# Patient Record
Sex: MALE | Race: BLACK OR AFRICAN AMERICAN | NOT HISPANIC OR LATINO | Employment: UNEMPLOYED | URBAN - METROPOLITAN AREA
[De-identification: names, ages, dates, MRNs, and addresses within clinical notes are randomized per-mention and may not be internally consistent; named-entity substitution may affect disease eponyms.]

---

## 2021-08-24 ENCOUNTER — HOSPITAL ENCOUNTER (EMERGENCY)
Facility: HOSPITAL | Age: 26
Discharge: LEFT BEFORE DISCHARGE | End: 2021-08-24

## 2021-08-24 VITALS
TEMPERATURE: 98 F | SYSTOLIC BLOOD PRESSURE: 138 MMHG | HEART RATE: 83 BPM | DIASTOLIC BLOOD PRESSURE: 90 MMHG | RESPIRATION RATE: 18 BRPM | OXYGEN SATURATION: 99 %

## 2021-08-24 PROCEDURE — 99211 OFF/OP EST MAY X REQ PHY/QHP: CPT

## 2021-08-24 NOTE — ED TRIAGE NOTES
Patient to triage desk and stated he wants to leave related to wait time. Encourage patient to stay at this time. Patient stated he wants to go to urgent care. Patient left at this time.

## 2021-08-24 NOTE — ED TRIAGE NOTES
Pt states that he cut the webbing of his thumb and first finger with a knife. Went to [facility] prior to arrival where his tetanus was updated and the wound was wrapped. Bleed is controlled at this time. Pt referred here. Patient masked at arrival and triage staff wore all appropriate PPE during entire encounter with patient.